# Patient Record
Sex: FEMALE | Race: WHITE | NOT HISPANIC OR LATINO | Employment: OTHER | URBAN - METROPOLITAN AREA
[De-identification: names, ages, dates, MRNs, and addresses within clinical notes are randomized per-mention and may not be internally consistent; named-entity substitution may affect disease eponyms.]

---

## 2020-02-28 ENCOUNTER — OFFICE VISIT (OUTPATIENT)
Dept: OCCUPATIONAL THERAPY | Facility: CLINIC | Age: 75
End: 2020-02-28
Payer: MEDICARE

## 2020-02-28 DIAGNOSIS — G31.84 MILD COGNITIVE IMPAIRMENT: Primary | ICD-10-CM

## 2020-02-28 PROCEDURE — 97166 OT EVAL MOD COMPLEX 45 MIN: CPT
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
## 2020-03-02 ENCOUNTER — TRANSCRIBE ORDERS (OUTPATIENT)
Dept: PHYSICAL THERAPY | Facility: CLINIC | Age: 75
End: 2020-03-02

## 2020-03-02 DIAGNOSIS — G31.84 MILD COGNITIVE IMPAIRMENT: Primary | ICD-10-CM

## 2020-03-03 ENCOUNTER — OFFICE VISIT (OUTPATIENT)
Dept: OCCUPATIONAL THERAPY | Facility: CLINIC | Age: 75
End: 2020-03-03
Payer: MEDICARE

## 2020-03-03 DIAGNOSIS — G31.84 MILD COGNITIVE IMPAIRMENT: Primary | ICD-10-CM

## 2020-03-03 PROCEDURE — 96125 COGNITIVE TEST BY HC PRO: CPT

## 2020-03-03 NOTE — LETTER
2020    Ernesto Curry MD  [address] [facility]  [facility]    Patient: Bunny Quinn   YOB: 1945   Date of Visit: 3/3/2020     Encounter Diagnosis     ICD-10-CM    1  Mild cognitive impairment G31 84        Dear Dr Vince Zarco: Thank you for your recent referral of Bunny Quinn  Please review the attached evaluation summary from Lidia's recent visit  Please verify that you agree with the plan of care by signing the attached order  If you have any questions or concerns, please do not hesitate to call  I sincerely appreciate the opportunity to share in the care of one of your patients and hope to have another opportunity to work with you in the near future  Sincerely,    Aleena Griffin, OT      Referring Provider:     I certify that I have read the below Plan of Care and certify the need for these services furnished under this plan of treatment while under my care  Ernesto Curry MD  [address] [facility] [address] [facility] [address]: [address] to [address] Evaluation:      Today's Date: 3/3/2020  Patient Name: Bunny Quinn  : 1945  MRN: [id number]  Referring Provider: Nhi Winter, *  Dx: Mild cognitive impairment [G31 84]    Active Problem List: There is no problem list on file for this patient  Past Medical Hx: No past medical history on file  Past Surgical Hx: No past surgical history on file  Objective  Functional/Cognitive Impairments:  1  DCAT: (see attached report for further details) Pt scoring an 90% likelihood on failing on road     Fit,     X Unfit  Recommend On Road Assessment:   Yes,     X No  Recommend to [employer]   Yes,     X No,     Due to: N/A  2  Probability of creating a hazardous situation on specialized on-road test: 72%; see attached report for further details        3  Recommendations:  Until there is a significant change in medical condition or a change in medication use resulting in an improvement in cognitive function, driving suspension or cessation should be seriously considered  Should there be a significant positive change in medical condition, reassessment at  that time would be recommended  MD to discuss with Pt  Of note, pt demo HIGH levels of anxiety  Pt was unable to complete FTD evaluation in 1 session due to fear, worry, and nervousness  Pt finished session today and req 45 min to perform 1 task  Please see other FTD OT note from 2/28/20 for further details    Assessment/Plan  Occupational Therapy Skilled Analysis Assessment and Plan of Care:  Pt is a 69 y o  female referred to Occupational Therapy for Fitness to Drive Evaluation to assess pts cognitive, visual, and motor abilities to drive safely in community environment  Pt scoring cognitively at an 90% predicted probability of failing  a specialized on-road test   This indicates  a cognitive competence for driving is outside the range of normal with a higher probability of performing hazardous or extremely dangerous maneuvers  Pt scoring cognitively at a 72% probability of creating a hazardous situation on a specialized road test  Below average scoring was noted in the following areas:        Initialization and reactions  Track and process visual events  Ability to encode, retrieve and/or respond to stimuli  Judgement of spatial relations  Decisions under time pressure  Guided movement control  IMPULSE CONTROL        Until there is a significant change in medical condition or a change in medication use resulting in an improvement in cognitive function, driving suspension or cessation should be seriously considered  Should there be a significant positive change in medical condition, reassessment at  that time would be recommended  MD to discuss with Pt           Based on the aforementioned OT evaluation, functional performance deficits, and assessments, pt has been identified as a low complexity evaluation  No further skillable OT needs indicated as pt referred for Fitness to Drive Evaluation only per consult script, D/C from OT caseload, D/C OT  MD to discuss results w/ pt      INTERVENTION COMMENTS:  Diagnosis: Mild cognitive impairment [G31 84]  Precautions: anxiety  FOTO: N/A for FTD Evaluations  Insurance: medicare

## 2020-03-03 NOTE — PROGRESS NOTES
Occupational Therapy Fit to Drive Evaluation:      Today's Date: 3/3/2020  Patient Name: Augustus Matos  : 1945  MRN: [id number]  Referring Provider: Anabel Davis, *  Dx: Mild cognitive impairment [G31 84]    Active Problem List: There is no problem list on file for this patient  Past Medical Hx: No past medical history on file  Past Surgical Hx: No past surgical history on file  Objective  Functional/Cognitive Impairments:  1  DCAT: (see attached report for further details) Pt scoring an 90% likelihood on failing on road     Fit,     X Unfit  Recommend On Road Assessment:   Yes,     X No  Recommend to [employer]   Yes,     X No,     Due to: N/A  2  Probability of creating a hazardous situation on specialized on-road test: 72%; see attached report for further details  3  Recommendations:  Until there is a significant change in medical condition or a change in medication use resulting in an improvement in cognitive function, driving suspension or cessation should be seriously considered  Should there be a significant positive change in medical condition, reassessment at  that time would be recommended  MD to discuss with Pt  Of note, pt demo HIGH levels of anxiety  Pt was unable to complete FTD evaluation in 1 session due to fear, worry, and nervousness  Pt finished session today and req 45 min to perform 1 task  Please see other FTD OT note from 20 for further details    Assessment/Plan  Occupational Therapy Skilled Analysis Assessment and Plan of Care:  Pt is a 69 y o  female referred to Occupational Therapy for Fitness to Drive Evaluation to assess pts cognitive, visual, and motor abilities to drive safely in community environment       Pt scoring cognitively at an 90% predicted probability of failing  a specialized on-road test   This indicates  a cognitive competence for driving is outside the range of normal with a higher probability of performing hazardous or extremely dangerous maneuvers  Pt scoring cognitively at a 72% probability of creating a hazardous situation on a specialized road test  Below average scoring was noted in the following areas:        Initialization and reactions  Track and process visual events  Ability to encode, retrieve and/or respond to stimuli  Judgement of spatial relations  Decisions under time pressure  Guided movement control  IMPULSE CONTROL        Until there is a significant change in medical condition or a change in medication use resulting in an improvement in cognitive function, driving suspension or cessation should be seriously considered  Should there be a significant positive change in medical condition, reassessment at  that time would be recommended  MD to discuss with Pt  Based on the aforementioned OT evaluation, functional performance deficits, and assessments, pt has been identified as a low complexity evaluation  No further skillable OT needs indicated as pt referred for Fitness to Drive Evaluation only per consult script, D/C from OT caseload, D/C OT  MD to discuss results w/ pt      INTERVENTION COMMENTS:  Diagnosis: Mild cognitive impairment [G31 84]  Precautions: anxiety  FOTO: N/A for FTD Evaluations  Insurance: medicare

## 2020-03-04 ENCOUNTER — TRANSCRIBE ORDERS (OUTPATIENT)
Dept: PHYSICAL THERAPY | Facility: CLINIC | Age: 75
End: 2020-03-04

## 2020-03-04 DIAGNOSIS — G31.84 MILD COGNITIVE IMPAIRMENT: Primary | ICD-10-CM
